# Patient Record
Sex: FEMALE | Race: WHITE | URBAN - METROPOLITAN AREA
[De-identification: names, ages, dates, MRNs, and addresses within clinical notes are randomized per-mention and may not be internally consistent; named-entity substitution may affect disease eponyms.]

---

## 2017-02-14 ENCOUNTER — IMPORTED ENCOUNTER (OUTPATIENT)
Dept: URBAN - METROPOLITAN AREA CLINIC 50 | Facility: CLINIC | Age: 77
End: 2017-02-14

## 2017-02-16 ENCOUNTER — IMPORTED ENCOUNTER (OUTPATIENT)
Dept: URBAN - METROPOLITAN AREA CLINIC 50 | Facility: CLINIC | Age: 77
End: 2017-02-16

## 2017-08-24 ENCOUNTER — IMPORTED ENCOUNTER (OUTPATIENT)
Dept: URBAN - METROPOLITAN AREA CLINIC 50 | Facility: CLINIC | Age: 77
End: 2017-08-24

## 2018-03-06 ENCOUNTER — IMPORTED ENCOUNTER (OUTPATIENT)
Dept: URBAN - METROPOLITAN AREA CLINIC 50 | Facility: CLINIC | Age: 78
End: 2018-03-06

## 2018-03-06 NOTE — PATIENT DISCUSSION
""" to review testing results with patient. "" ""Based on increased c/d ratio and/or ocular hypertension

## 2018-10-31 ENCOUNTER — IMPORTED ENCOUNTER (OUTPATIENT)
Dept: URBAN - METROPOLITAN AREA CLINIC 50 | Facility: CLINIC | Age: 78
End: 2018-10-31

## 2019-04-03 ENCOUNTER — IMPORTED ENCOUNTER (OUTPATIENT)
Dept: URBAN - METROPOLITAN AREA CLINIC 50 | Facility: CLINIC | Age: 79
End: 2019-04-03

## 2019-10-02 ENCOUNTER — IMPORTED ENCOUNTER (OUTPATIENT)
Dept: URBAN - METROPOLITAN AREA CLINIC 50 | Facility: CLINIC | Age: 79
End: 2019-10-02

## 2020-04-08 ENCOUNTER — IMPORTED ENCOUNTER (OUTPATIENT)
Dept: URBAN - METROPOLITAN AREA CLINIC 50 | Facility: CLINIC | Age: 80
End: 2020-04-08

## 2020-10-08 ENCOUNTER — IMPORTED ENCOUNTER (OUTPATIENT)
Dept: URBAN - METROPOLITAN AREA CLINIC 50 | Facility: CLINIC | Age: 80
End: 2020-10-08

## 2020-10-13 ENCOUNTER — IMPORTED ENCOUNTER (OUTPATIENT)
Dept: URBAN - METROPOLITAN AREA CLINIC 50 | Facility: CLINIC | Age: 80
End: 2020-10-13

## 2020-12-02 ENCOUNTER — IMPORTED ENCOUNTER (OUTPATIENT)
Dept: URBAN - METROPOLITAN AREA CLINIC 50 | Facility: CLINIC | Age: 80
End: 2020-12-02

## 2021-04-17 ASSESSMENT — VISUAL ACUITY
OS_CC: J4
OD_CC: J4
OD_CC: J1+@ 14 IN
OD_SC: 20/30+1
OD_CC: J1+
OD_SC: 20/20-
OS_CC: J1+
OS_SC: 20/25
OD_SC: 20/20-
OD_SC: 20/20-2
OS_CC: J1+@ 14 IN
OS_SC: 20/40
OS_SC: 20/20-
OD_SC: 20/30+
OD_SC: 20/20-1
OS_SC: 20/25
OD_CC: J1+
OD_SC: 20/25+1
OD_PH: 20/25-1
OS_PH: 20/30
OD_SC: 20/30-1
OS_SC: 20/20
OS_SC: 20/20-2
OS_SC: 20/25
OS_CC: J1+
OS_SC: 20/20

## 2021-04-17 ASSESSMENT — TONOMETRY
OS_IOP_MMHG: 13
OD_IOP_MMHG: 12
OS_IOP_MMHG: 15
OD_IOP_MMHG: 14
OS_IOP_MMHG: 13
OD_IOP_MMHG: 12
OD_IOP_MMHG: 14
OD_IOP_MMHG: 12
OS_IOP_MMHG: 13
OD_IOP_MMHG: 12
OD_IOP_MMHG: 13
OS_IOP_MMHG: 12
OS_IOP_MMHG: 13
OS_IOP_MMHG: 14
OD_IOP_MMHG: 13
OS_IOP_MMHG: 12

## 2021-06-24 ENCOUNTER — PROBLEM (OUTPATIENT)
Dept: URBAN - METROPOLITAN AREA CLINIC 48 | Facility: CLINIC | Age: 81
End: 2021-06-24

## 2021-06-24 DIAGNOSIS — H35.373: ICD-10-CM

## 2021-06-24 DIAGNOSIS — H43.812: ICD-10-CM

## 2021-06-24 DIAGNOSIS — H35.3131: ICD-10-CM

## 2021-06-24 DIAGNOSIS — H40.013: ICD-10-CM

## 2021-06-24 PROCEDURE — 92014 COMPRE OPH EXAM EST PT 1/>: CPT

## 2021-06-24 PROCEDURE — 92134 CPTRZ OPH DX IMG PST SGM RTA: CPT

## 2021-06-24 ASSESSMENT — VISUAL ACUITY
OS_PH: 20/25
OS_CC: 20/30-1
OU_CC: J1+
OD_CC: 20/30-2
OD_PH: 20/25

## 2021-06-24 ASSESSMENT — TONOMETRY
OS_IOP_MMHG: 16
OD_IOP_MMHG: 16

## 2021-06-24 NOTE — PATIENT DISCUSSION
Based on c/d asymmetry, OD>OS. IOP stable, 16 OU. S/p ECP OU. Last OCT(RNFL)/HVF done 10/2020 stable x 3 years OD/OS. No gtts needed at this time.

## 2021-06-24 NOTE — PATIENT DISCUSSION
Patient reports episode of faces in a picture frame appearing as they were melting for ~2hrs one day ago. Denies any neurological symptoms. Unsure if monocular or binocular. Has not happened since. DFE unremarkable today. Recommended for patient to instill ATs if episode was to happen again to rule out KIRT. Monitor.

## 2021-11-04 ENCOUNTER — DIAGNOSTICS - HVF/OCT (OUTPATIENT)
Dept: URBAN - METROPOLITAN AREA CLINIC 48 | Facility: CLINIC | Age: 81
End: 2021-11-04

## 2021-11-04 DIAGNOSIS — H40.013: ICD-10-CM

## 2021-11-04 PROCEDURE — 92133 CPTRZD OPH DX IMG PST SGM ON: CPT

## 2021-11-04 PROCEDURE — 92083 EXTENDED VISUAL FIELD XM: CPT

## 2021-11-04 NOTE — PATIENT DISCUSSION
OCT RNFL (11/04/2021) wnl and stable OD/OS. HVF (11/04/2021) abnormal and shows slight worsening OD/OS. RTC next available repeat HVF OD/OS at no charge.

## 2021-12-02 ENCOUNTER — ANNUAL COMPREHENSIVE EXAM (OUTPATIENT)
Dept: URBAN - METROPOLITAN AREA CLINIC 48 | Facility: CLINIC | Age: 81
End: 2021-12-02

## 2021-12-02 DIAGNOSIS — H35.373: ICD-10-CM

## 2021-12-02 DIAGNOSIS — H43.813: ICD-10-CM

## 2021-12-02 DIAGNOSIS — H40.013: ICD-10-CM

## 2021-12-02 DIAGNOSIS — H35.3131: ICD-10-CM

## 2021-12-02 DIAGNOSIS — H04.123: ICD-10-CM

## 2021-12-02 PROCEDURE — 92014 COMPRE OPH EXAM EST PT 1/>: CPT

## 2021-12-02 PROCEDURE — 92134 CPTRZ OPH DX IMG PST SGM RTA: CPT

## 2021-12-02 ASSESSMENT — VISUAL ACUITY
OU_CC: J1+
OU_SC: 20/30-1/+1
OS_SC: 20/30
OD_SC: 20/30-2

## 2021-12-02 ASSESSMENT — TONOMETRY
OD_IOP_MMHG: 12
OS_IOP_MMHG: 11

## 2022-01-11 ENCOUNTER — DIAGNOSTICS ONLY (OUTPATIENT)
Dept: URBAN - METROPOLITAN AREA CLINIC 48 | Facility: CLINIC | Age: 82
End: 2022-01-11

## 2022-01-11 DIAGNOSIS — H40.013: ICD-10-CM

## 2022-01-11 PROCEDURE — 92083 EXTENDED VISUAL FIELD XM: CPT

## 2022-01-11 NOTE — PATIENT DISCUSSION
Based on c/d asymmetry, OD>OS. IOP stable, 12/11. (-) family hx. S/p ECP OU. OCT RNFL (11/04/2021) wnl and stable OD/OS. HVF (11/04/2021) abnormal and shows slight worsening OD/OS.

## 2022-12-06 ENCOUNTER — COMPREHENSIVE EXAM (OUTPATIENT)
Dept: URBAN - METROPOLITAN AREA CLINIC 48 | Facility: LOCATION | Age: 82
End: 2022-12-06

## 2022-12-06 PROCEDURE — 92014 COMPRE OPH EXAM EST PT 1/>: CPT

## 2022-12-06 PROCEDURE — 92134 CPTRZ OPH DX IMG PST SGM RTA: CPT

## 2022-12-06 PROCEDURE — 92015 DETERMINE REFRACTIVE STATE: CPT

## 2022-12-06 ASSESSMENT — KERATOMETRY
OD_AXISANGLE2_DEGREES: 180
OS_K1POWER_DIOPTERS: 44.50
OD_AXISANGLE_DEGREES: 90
OS_K2POWER_DIOPTERS: 44.75
OS_AXISANGLE2_DEGREES: 180
OD_K2POWER_DIOPTERS: 44.50
OD_K1POWER_DIOPTERS: 44.25
OS_AXISANGLE_DEGREES: 90

## 2022-12-06 ASSESSMENT — VISUAL ACUITY
OU_CC: 20/30
OD_CC: 20/30
OS_SC: 20/30
OU_SC: 20/30
OD_SC: 20/30-1
OS_CC: 20/30

## 2022-12-06 ASSESSMENT — TONOMETRY
OS_IOP_MMHG: 13
OD_IOP_MMHG: 14

## 2022-12-06 NOTE — PATIENT DISCUSSION
Based on c/d asymmetry, OD>OS. IOP stable, 14/13. (-) family hx. S/p ECP OU. OCT RNFL (11/04/2021) wnl and stable OD/OS. HVF (11/04/2021) abnormal and shows slight worsening OD/OS.

## 2022-12-06 NOTE — PATIENT DISCUSSION
Repeat HVF (01/11/2022) worse than last OD/OS. Ring defect OU. Patient did not come in for repeat last year as requested.

## 2023-04-12 ENCOUNTER — DIAGNOSTICS ONLY (OUTPATIENT)
Dept: URBAN - METROPOLITAN AREA CLINIC 48 | Facility: LOCATION | Age: 83
End: 2023-04-12

## 2023-04-12 DIAGNOSIS — H40.013: ICD-10-CM

## 2023-04-12 PROCEDURE — 92083 EXTENDED VISUAL FIELD XM: CPT

## 2023-04-12 PROCEDURE — 92133 CPTRZD OPH DX IMG PST SGM ON: CPT

## 2023-04-12 ASSESSMENT — KERATOMETRY
OS_K2POWER_DIOPTERS: 44.75
OD_AXISANGLE_DEGREES: 90
OD_K1POWER_DIOPTERS: 44.25
OD_K2POWER_DIOPTERS: 44.50
OS_AXISANGLE2_DEGREES: 180
OS_AXISANGLE_DEGREES: 90
OS_K1POWER_DIOPTERS: 44.50
OD_AXISANGLE2_DEGREES: 180

## 2023-12-19 ENCOUNTER — COMPREHENSIVE EXAM (OUTPATIENT)
Dept: URBAN - METROPOLITAN AREA CLINIC 48 | Facility: LOCATION | Age: 83
End: 2023-12-19

## 2023-12-19 DIAGNOSIS — H35.3111: ICD-10-CM

## 2023-12-19 DIAGNOSIS — H43.813: ICD-10-CM

## 2023-12-19 DIAGNOSIS — H35.3222: ICD-10-CM

## 2023-12-19 DIAGNOSIS — H35.373: ICD-10-CM

## 2023-12-19 DIAGNOSIS — H40.013: ICD-10-CM

## 2023-12-19 PROCEDURE — 92014 COMPRE OPH EXAM EST PT 1/>: CPT

## 2023-12-19 PROCEDURE — 92134 CPTRZ OPH DX IMG PST SGM RTA: CPT

## 2023-12-19 ASSESSMENT — VISUAL ACUITY
OU_SC: J16@14
OD_SC: 20/40
OS_GLARE: 20/70
OS_GLARE: 20/70
OD_GLARE: 20/30
OD_GLARE: 20/30
OS_SC: 20/50-2

## 2023-12-19 ASSESSMENT — TONOMETRY
OS_IOP_MMHG: 15
OD_IOP_MMHG: 15

## 2023-12-19 ASSESSMENT — KERATOMETRY
OS_K2POWER_DIOPTERS: 43.75
OD_K2POWER_DIOPTERS: 43.75
OD_K1POWER_DIOPTERS: 44.25
OS_AXISANGLE_DEGREES: 170
OS_AXISANGLE2_DEGREES: 80
OS_K1POWER_DIOPTERS: 44.25
OD_AXISANGLE_DEGREES: 10
OD_AXISANGLE2_DEGREES: 100

## 2024-05-03 ENCOUNTER — DIAGNOSTICS ONLY (OUTPATIENT)
Dept: URBAN - METROPOLITAN AREA CLINIC 48 | Facility: LOCATION | Age: 84
End: 2024-05-03

## 2024-05-03 DIAGNOSIS — H40.013: ICD-10-CM

## 2024-05-03 PROCEDURE — 92083 EXTENDED VISUAL FIELD XM: CPT

## 2024-05-03 PROCEDURE — 92133 CPTRZD OPH DX IMG PST SGM ON: CPT

## 2024-05-03 ASSESSMENT — KERATOMETRY
OS_K2POWER_DIOPTERS: 43.75
OS_K1POWER_DIOPTERS: 44.25
OS_AXISANGLE2_DEGREES: 80
OD_AXISANGLE_DEGREES: 10
OD_AXISANGLE2_DEGREES: 100
OS_AXISANGLE_DEGREES: 170
OD_K2POWER_DIOPTERS: 43.75
OD_K1POWER_DIOPTERS: 44.25

## 2025-02-17 ENCOUNTER — COMPREHENSIVE EXAM (OUTPATIENT)
Age: 85
End: 2025-02-17

## 2025-02-17 DIAGNOSIS — H04.123: ICD-10-CM

## 2025-02-17 DIAGNOSIS — H35.3111: ICD-10-CM

## 2025-02-17 DIAGNOSIS — H35.3222: ICD-10-CM

## 2025-02-17 DIAGNOSIS — H40.013: ICD-10-CM

## 2025-02-17 DIAGNOSIS — H43.813: ICD-10-CM

## 2025-02-17 PROCEDURE — 99214 OFFICE O/P EST MOD 30 MIN: CPT

## 2025-02-17 PROCEDURE — 92134 CPTRZ OPH DX IMG PST SGM RTA: CPT
